# Patient Record
Sex: FEMALE | Employment: OTHER | URBAN - METROPOLITAN AREA
[De-identification: names, ages, dates, MRNs, and addresses within clinical notes are randomized per-mention and may not be internally consistent; named-entity substitution may affect disease eponyms.]

---

## 2017-04-12 ENCOUNTER — OFFICE VISIT (OUTPATIENT)
Dept: HEMATOLOGY | Age: 57
End: 2017-04-12

## 2017-04-12 ENCOUNTER — HOSPITAL ENCOUNTER (OUTPATIENT)
Dept: LAB | Age: 57
Discharge: HOME | End: 2017-04-12
Payer: COMMERCIAL

## 2017-04-12 VITALS
RESPIRATION RATE: 16 BRPM | BODY MASS INDEX: 32.62 KG/M2 | SYSTOLIC BLOOD PRESSURE: 151 MMHG | OXYGEN SATURATION: 100 % | DIASTOLIC BLOOD PRESSURE: 81 MMHG | TEMPERATURE: 98.2 F | HEART RATE: 75 BPM | WEIGHT: 203 LBS | HEIGHT: 66 IN

## 2017-04-12 DIAGNOSIS — B18.2 CHRONIC HEPATITIS C WITHOUT HEPATIC COMA (HCC): Primary | ICD-10-CM

## 2017-04-12 DIAGNOSIS — B18.2 CHRONIC HEPATITIS C WITHOUT HEPATIC COMA (HCC): ICD-10-CM

## 2017-04-12 PROBLEM — E78.00 HYPERCHOLESTEROLEMIA: Status: ACTIVE | Noted: 2017-04-12

## 2017-04-12 PROBLEM — J45.909 ASTHMA: Status: ACTIVE | Noted: 2017-04-12

## 2017-04-12 PROBLEM — E11.9 TYPE II DIABETES MELLITUS (HCC): Status: ACTIVE | Noted: 2017-04-12

## 2017-04-12 PROBLEM — I10 HYPERTENSION: Status: ACTIVE | Noted: 2017-04-12

## 2017-04-12 PROBLEM — Z94.7 HISTORY OF CORNEA TRANSPLANT: Status: ACTIVE | Noted: 2017-04-12

## 2017-04-12 PROBLEM — Z90.49 S/P CHOLECYSTECTOMY: Status: ACTIVE | Noted: 2017-04-12

## 2017-04-12 LAB
ALBUMIN SERPL BCP-MCNC: 3.2 G/DL (ref 3.4–5)
ALBUMIN/GLOB SERPL: 0.7 {RATIO} (ref 0.8–1.7)
ALP SERPL-CCNC: 114 U/L (ref 45–117)
ALT SERPL-CCNC: 38 U/L (ref 13–56)
ANION GAP BLD CALC-SCNC: 10 MMOL/L (ref 3–18)
AST SERPL W P-5'-P-CCNC: 42 U/L (ref 15–37)
BASOPHILS # BLD AUTO: 0 K/UL (ref 0–0.06)
BASOPHILS # BLD: 0 % (ref 0–2)
BILIRUB DIRECT SERPL-MCNC: 0.1 MG/DL (ref 0–0.2)
BILIRUB SERPL-MCNC: 0.3 MG/DL (ref 0.2–1)
BUN SERPL-MCNC: 29 MG/DL (ref 7–18)
BUN/CREAT SERPL: 20 (ref 12–20)
CALCIUM SERPL-MCNC: 8.8 MG/DL (ref 8.5–10.1)
CHLORIDE SERPL-SCNC: 106 MMOL/L (ref 100–108)
CO2 SERPL-SCNC: 26 MMOL/L (ref 21–32)
CREAT SERPL-MCNC: 1.44 MG/DL (ref 0.6–1.3)
DIFFERENTIAL METHOD BLD: ABNORMAL
EOSINOPHIL # BLD: 0.1 K/UL (ref 0–0.4)
EOSINOPHIL NFR BLD: 1 % (ref 0–5)
ERYTHROCYTE [DISTWIDTH] IN BLOOD BY AUTOMATED COUNT: 13 % (ref 11.6–14.5)
GLOBULIN SER CALC-MCNC: 4.4 G/DL (ref 2–4)
GLUCOSE SERPL-MCNC: 82 MG/DL (ref 74–99)
HCT VFR BLD AUTO: 36.8 % (ref 35–45)
HGB BLD-MCNC: 11.4 G/DL (ref 12–16)
LYMPHOCYTES # BLD AUTO: 41 % (ref 21–52)
LYMPHOCYTES # BLD: 1.8 K/UL (ref 0.9–3.6)
MCH RBC QN AUTO: 27.2 PG (ref 24–34)
MCHC RBC AUTO-ENTMCNC: 31 G/DL (ref 31–37)
MCV RBC AUTO: 87.8 FL (ref 74–97)
MONOCYTES # BLD: 0.3 K/UL (ref 0.05–1.2)
MONOCYTES NFR BLD AUTO: 7 % (ref 3–10)
NEUTS SEG # BLD: 2.3 K/UL (ref 1.8–8)
NEUTS SEG NFR BLD AUTO: 51 % (ref 40–73)
PLATELET # BLD AUTO: 334 K/UL (ref 135–420)
PMV BLD AUTO: 13 FL (ref 9.2–11.8)
POTASSIUM SERPL-SCNC: 5.6 MMOL/L (ref 3.5–5.5)
PROT SERPL-MCNC: 7.6 G/DL (ref 6.4–8.2)
RBC # BLD AUTO: 4.19 M/UL (ref 4.2–5.3)
SODIUM SERPL-SCNC: 142 MMOL/L (ref 136–145)
WBC # BLD AUTO: 4.5 K/UL (ref 4.6–13.2)

## 2017-04-12 PROCEDURE — 87902 NFCT AGT GNTYP ALYS HEP C: CPT | Performed by: INTERNAL MEDICINE

## 2017-04-12 PROCEDURE — 87521 HEPATITIS C PROBE&RVRS TRNSC: CPT | Performed by: INTERNAL MEDICINE

## 2017-04-12 PROCEDURE — 86706 HEP B SURFACE ANTIBODY: CPT | Performed by: INTERNAL MEDICINE

## 2017-04-12 PROCEDURE — 80076 HEPATIC FUNCTION PANEL: CPT | Performed by: INTERNAL MEDICINE

## 2017-04-12 PROCEDURE — 86708 HEPATITIS A ANTIBODY: CPT | Performed by: INTERNAL MEDICINE

## 2017-04-12 PROCEDURE — 87900 PHENOTYPE INFECT AGENT DRUG: CPT | Performed by: INTERNAL MEDICINE

## 2017-04-12 PROCEDURE — 86704 HEP B CORE ANTIBODY TOTAL: CPT | Performed by: INTERNAL MEDICINE

## 2017-04-12 PROCEDURE — 36415 COLL VENOUS BLD VENIPUNCTURE: CPT | Performed by: INTERNAL MEDICINE

## 2017-04-12 PROCEDURE — 80048 BASIC METABOLIC PNL TOTAL CA: CPT | Performed by: INTERNAL MEDICINE

## 2017-04-12 PROCEDURE — 87522 HEPATITIS C REVRS TRNSCRPJ: CPT | Performed by: INTERNAL MEDICINE

## 2017-04-12 PROCEDURE — 85025 COMPLETE CBC W/AUTO DIFF WBC: CPT | Performed by: INTERNAL MEDICINE

## 2017-04-12 RX ORDER — ASPIRIN 81 MG/1
TABLET ORAL DAILY
COMMUNITY

## 2017-04-12 NOTE — MR AVS SNAPSHOT
Visit Information Date & Time Provider Department Dept. Phone Encounter #  
 4/12/2017 11:30 AM Moshe Waite MD [address] [id number] Follow-up Instructions Return in about 4 weeks (around 5/10/2017) for Carlitos Vega. Follow-up and Disposition History Your Appointments 6/20/2017  2:30 PM  
Follow Up with Faustino Sargent NP [facility] [place] [facility] Appt Note: HCV  
 [id number] [address] [place] [address] [place] [address] [place] [address] Upcoming Health Maintenance Date Due Hepatitis C Screening 1960 DTaP/Tdap/Td series (1 - Tdap) 1/29/1980 PAP AKA CERVICAL CYTOLOGY 1/29/1980 BREAST CANCER SCRN MAMMOGRAM 1/29/2009 FOBT Q 1 YEAR AGE 50-75 1/29/2009 INFLUENZA AGE 9 TO ADULT 8/1/2016 Allergies as of 4/12/2017  Review Complete On: 4/12/2017 By: Moshe Waite MD  
 No Known Allergies Current Immunizations  Never Reviewed No immunizations on file. Not reviewed this visit You Were Diagnosed With   
  
 Codes Comments Chronic hepatitis C without hepatic coma (HCC)    -  Primary ICD-10-CM: B18.2 ICD-9-CM: 070.54 Vitals BP Pulse Temp Resp Height(growth percentile) Weight(growth percentile) 151/81 (BP 1 Location: Left arm, BP Patient Position: Sitting) 75 98.2 °F (36.8 °C) (Tympanic) 16 5' 6\" (1.676 m) 203 lb (92.1 kg) LMP SpO2 BMI OB Status Smoking Status (Exact Date) 100% 32.77 kg/m2 Menopause Never Smoker Vitals History BMI and BSA Data Body Mass Index Body Surface Area 32.77 kg/m 2 2.07 m 2 Your Updated Medication List  
  
   
This list is accurate as of: 4/12/17 12:19 PM.  Always use your most recent med list.  
  
  
  
  
 Lawrance Kussmaul Take  by inhalation. ALBUTEROL IN Take  by inhalation. aspirin delayed-release 81 mg tablet Take  by mouth daily. CLARITIN PO Take  by mouth. GLIPIZIDE PO Take  by mouth. JANUMET 50-1,000 mg per tablet Generic drug:  SITagliptin-metFORMIN Take 1 Tab by mouth two (2) times daily (with meals). LOSARTAN PO Take  by mouth. PRAVASTATIN PO Take  by mouth. Follow-up Instructions Return in about 4 weeks (around 5/10/2017) for [address] To-Do List   
 04/12/2017 Lab:  CBC WITH AUTOMATED DIFF   
  
 04/12/2017 Lab:  HCV NS5A DRUG RESISTANCE ASSAY   
  
 04/12/2017 Lab:  HCV RNA BY MANISH QL,RFLX TO QT   
  
 04/12/2017 Lab:  HEP A AB, TOTAL   
  
 04/12/2017 Lab:  HEP B SURFACE AB   
  
 04/12/2017 Lab:  HEP C GENOTYPE   
  
 04/12/2017 Lab:  HEPATIC FUNCTION PANEL   
  
 04/12/2017 Lab:  HEPATITIS B CORE AB, TOTAL   
  
 04/12/2017 Lab:  METABOLIC PANEL, BASIC   
  
 04/12/2017 Imaging:  US ABD LTD W ELASTOGRAPHY Introducing [facility]! King Huy introduces [employer] patient portal. Now you can access parts of your medical record, email your doctor's office, and request medication refills online. 1. In your internet browser, go to [email or web address] 2. Click on the First Time User? Click Here link in the Sign In box. You will see the New Member Sign Up page. 3. Enter your [employer] Access Code exactly as it appears below. You will not need to use this code after youve completed the sign-up process. If you do not sign up before the expiration date, you must request a new code. · [employer] Access Code: [email or web address] Expires: 7/11/2017 12:19 PM 
 
4. Enter the last four digits of your Social Security Number (xxxx) and Date of Birth (mm/dd/yyyy) as indicated and click Submit. You will be taken to the next sign-up page. 5. Create a [employer] ID.  This will be your [employer] login ID and cannot be changed, so think of one that is secure and easy to remember. 6. Create a [employer] password. You can change your password at any time. 7. Enter your Password Reset Question and Answer. This can be used at a later time if you forget your password. 8. Enter your e-mail address. You will receive e-mail notification when new information is available in [address] 9. Click Sign Up. You can now view and download portions of your medical record. 10. Click the Download Summary menu link to download a portable copy of your medical information. If you have questions, please visit the Frequently Asked Questions section of the [employer] website. Remember, [employer] is NOT to be used for urgent needs. For medical emergencies, dial 911. Now available from your iPhone and Android! Please provide this summary of care documentation to your next provider. Your primary care clinician is listed as Jayleen Mccurdy. If you have any questions after today's visit, please call [phone].

## 2017-04-12 NOTE — PROGRESS NOTES
93 Rizwan Boykin MD, CAROLE Roberts PA-C Earlyne Lease, MD, FACP, MD Nancy Durham, CAROLE Schmitz NP        [address] [facility], [address] [address]  22.     [phone]     FAX: [address], [address]     [place], [address]     [phone]     FAX: [phone]         Patient Care Team:  Eran Hernandez, [address] as PCP - General (Physician Assistant)      Problem List  Date Reviewed: 4/12/2017          Codes Class Noted    Chronic hepatitis C ([facility] ICD-10-CM: B18.2  ICD-9-CM: 070.54  4/12/2017        Type II diabetes mellitus ([facility] ICD-10-CM: E11.9  ICD-9-CM: 250.00  4/12/2017        Hypertension ICD-10-CM: I10  ICD-9-CM: 401.9  4/12/2017        Hypercholesterolemia ICD-10-CM: E78.00  ICD-9-CM: 272.0  4/12/2017        Asthma ICD-10-CM: J45.909  ICD-9-CM: 493.90  4/12/2017        History of cornea transplant ICD-10-CM: Z94.7  ICD-9-CM: V42.5  4/12/2017        S/P cholecystectomy ICD-10-CM: Z90.49  ICD-9-CM: V45.79  4/12/2017                The physicians listed above have asked me to see Pamela Hernandez in consultation regarding chronic HCV and its management. All medical records sent by the referring physicians were reviewed     The patient is a 61 y.o. Black female who was noted to have abnormalities in liver chemistries and subsequently tested positive for chronic HCV in 8/2016. Risk factors for acquiring HCV are not apparent. There was no history of acute incteric hepatitis. Imaging of the liver was not performed. An assessment of liver fibrosis with biopsy or elastography has not been performed. The patient has never received treatment for chronic HCV.       The most recent laboratory studies indicate that the liver transaminases are elevated, alkaline phosphatase is normal, tests of hepatic synthetic and metabolic function are normal, and the platelet count is normal.      The patient has no symptoms which can be attributed to the liver disorder. The patient has not experienced fatigue, pain in the right side over the liver,     The patient completes all daily activities without any functional limitations. ALLERGIES  No Known Allergies    MEDICATIONS  Current Outpatient Prescriptions   Medication Sig    SITagliptin-metFORMIN (JANUMET) 50-1,000 mg per tablet Take 1 Tab by mouth two (2) times daily (with meals).  GLIPIZIDE PO Take  by mouth.  LOSARTAN PO Take  by mouth.  PRAVASTATIN SODIUM (PRAVASTATIN PO) Take  by mouth.  LORATADINE (CLARITIN PO) Take  by mouth.  aspirin delayed-release 81 mg tablet Take  by mouth daily.  ALBUTEROL IN Take  by inhalation.  FLUTICASONE/SALMETEROL (ADVAIR DISKUS IN) Take  by inhalation. No current facility-administered medications for this visit. SYSTEM REVIEW NOT RELATED TO LIVER DISEASE OR REVIEWED ABOVE:  Constitution systems: Negative for fever, chills, weight gain, weight loss. Eyes: Negative for visual changes. ENT: Negative for sore throat, painful swallowing. Respiratory: Negative for cough, hemoptysis, SOB. Cardiology: Negative for chest pain, palpitations. GI:  Negative for constipation or diarrhea. : Negative for urinary frequency, dysuria, hematuria, nocturia. Skin: Negative for rash. Hematology: Negative for easy bruising, blood clots. Musculo-skelatal: Negative for back pain, muscle pain, weakness. Neurologic: Negative for headaches, dizziness, vertigo, memory problems not related to HE. Psychology: Negative for anxiety, depression. FAMILY HISTORY:  The patient has no knowledge of the father's medical condition. The mother has the following chronic diseases: breast cancer. There is no family history of liver disease.       SOCIAL HISTORY:  The patient is . The patient has no children. The patient has never used tobacco products. The patient has never consumed significant amounts of alcohol. The patient used to work for [employer]. The patient retired in 2015. PHYSICAL EXAMINATION:  /81 (BP 1 Location: Left arm, BP Patient Position: Sitting)  Pulse 75  Temp 98.2 °F (36.8 °C) (Tympanic)   Resp 16  Ht 5' 6\" (1.676 m)  Wt 203 lb (92.1 kg)  LMP  (Exact Date)  SpO2 100%  BMI 32.77 kg/m2  General: No acute distress. Eyes: Sclera anicteric. ENT: No oral lesions. Thyroid normal.  Nodes: No adenopathy. Skin: No spider angiomata. No jaundice. No palmar erythema. Respiratory: Lungs clear to auscultation. Cardiovascular: Regular heart rate. No murmurs. No JVD. Abdomen: Soft non-tender. Liver size normal to percussion/palpation. Spleen not palpable. No obvious ascites. Extremities: No edema. No muscle wasting. No gross arthritic changes. Neurologic: Alert and oriented. Cranial nerves grossly intact. No asterixis. LABORATORY STUDIES:  From 8/2016  AST/ALT/ALP/T Bili/ALB:  35/28/166/0.6/3.4  WBC/HB/PLT/INR:  4.9/11.7/286  BUN/CREAT:  18/1.0    SEROLOGIES:  8/2016. HBsurface antigen negative, anti-HCV positive. LIVER HISTOLOGY:  Not available or performed    ENDOSCOPIC PROCEDURES:  Not available or performed    RADIOLOGY:  Not available or performed    OTHER TESTING:  Not available or performed    ASSESSMENT AND PLAN:  Chronic HCV of unclear severity. Liver function is normal.  The platelet count is normal.      Will perform laboratory testing to monitor liver function and degree of liver injury. This included BMP, hepatic panel, CBC with platelet count,     Will perform and/or review results of HCV viral load and HCV genotype to define the specific treatment and duration of treatment that will be required.       Will perform serologic and virologic studies to assess for other causes of chronic liver disease. Will perform imaging of the liver with ultrasound. The need to perform an assessment of liver fibrosis was discussed with the patient. Liver biopsy has been used to assess the degree of fibrosis and the need for HCV treatment. The risks of performing the liver biopsy including pain, puncture of the lung, gallbladder, intestine or kidney and bleeding were discussed. The need to assess liver fibrosis was discussed. Sheer wave elastography can assess liver fibrosis and determine if a patient has advanced fibrosis or cirrhosis without the need for liver biopsy. Sheer wave elastography is now available at [facility]. This will be scheduled. The patient has not previously been treated for HCV. Discussed the treatment alternatives. The SVR/cure rate for HCV now exceeds 90% with just oral anti-viral therapy and no interferon injections or significant side effects for most patients with HCV. The specific treatment is dependent upon genotype, viral load and histology. The patient was directed to continue all current medications at the current dosages. There are no contraindications for the patient to take any medications that are necessary for treatment of other medical issues. The patient was counseled regarding alcohol consumption. The need for vaccination against viral hepatitis A and B will be assessed with serologic and instituted as appropriate. All of the above issues were discussed with the patient. All questions were answered. The patient expressed a clear understanding of the above. [address] [address] in 4 weeks to initiate HCV treatment.     Kaden Lopez MD  [facility] [place] of [address] [place] [address], [address] [place] [address] Street - [address]  [phone]

## 2017-04-13 LAB
HAV AB SER QL IA: NEGATIVE
HBV & HDV AB SER-IMP: NEGATIVE
HBV CORE AB SERPL QL IA: NEGATIVE
HBV SURFACE AB SERPL IA-ACNC: <3.1 MIU/ML
HEP BS AB COMMENT,HBSAC: ABNORMAL

## 2017-04-16 LAB
HCV RNA SERPL NAA+PROBE-ACNC: NORMAL IU/ML
HCV RNA SERPL NAA+PROBE-LOG IU: 5.96 LOG10 IU/ML
HCV RNA SERPL QL NAA+PROBE: POSITIVE
TEST INFORMATION:, 550031: NORMAL

## 2017-04-17 LAB
HCV GENTYP SERPL NAA+PROBE: NORMAL
PLEASE NOTE, 550474: NORMAL

## 2017-04-25 ENCOUNTER — HOSPITAL ENCOUNTER (OUTPATIENT)
Dept: ULTRASOUND IMAGING | Age: 57
Discharge: HOME | End: 2017-04-25
Attending: INTERNAL MEDICINE
Payer: COMMERCIAL

## 2017-04-25 DIAGNOSIS — B18.2 CHRONIC HEPATITIS C WITHOUT HEPATIC COMA (HCC): ICD-10-CM

## 2017-04-25 PROCEDURE — 0346T US ABD LTD W ELASTOGRAPHY: CPT

## 2017-04-26 LAB
HCV NS5 MUT DET ISLT GENOTYP: NORMAL
HCV RESIS PANELL ISLT GENOTYP: NORMAL
REF LAB TEST METHOD: NORMAL

## 2017-05-02 ENCOUNTER — TELEPHONE (OUTPATIENT)
Dept: HEMATOLOGY | Age: 57
End: 2017-05-02

## 2017-05-02 NOTE — TELEPHONE ENCOUNTER
Received call from patient. Verified name and date of birth. Patient informed me that she had Elastography imaging and labs drawn in April. Patient next appointment with Eduin Arnett June 2017, however would like results prior to that appointment.

## 2017-05-04 NOTE — TELEPHONE ENCOUNTER
Pt called to go over lab results and U/S results. Verified upcoming appt. Pt. Verbalized understanding.

## 2017-08-16 ENCOUNTER — HOSPITAL ENCOUNTER (OUTPATIENT)
Dept: LAB | Age: 57
Discharge: HOME | End: 2017-08-16
Payer: COMMERCIAL

## 2017-08-16 ENCOUNTER — OFFICE VISIT (OUTPATIENT)
Dept: HEMATOLOGY | Age: 57
End: 2017-08-16

## 2017-08-16 VITALS
DIASTOLIC BLOOD PRESSURE: 85 MMHG | WEIGHT: 202.2 LBS | SYSTOLIC BLOOD PRESSURE: 177 MMHG | HEIGHT: 66 IN | TEMPERATURE: 97.6 F | RESPIRATION RATE: 16 BRPM | OXYGEN SATURATION: 100 % | BODY MASS INDEX: 32.5 KG/M2 | HEART RATE: 78 BPM

## 2017-08-16 DIAGNOSIS — B18.2 CHRONIC HEPATITIS C WITHOUT HEPATIC COMA (HCC): Primary | ICD-10-CM

## 2017-08-16 DIAGNOSIS — B18.2 CHRONIC HEPATITIS C WITHOUT HEPATIC COMA (HCC): ICD-10-CM

## 2017-08-16 LAB
ALBUMIN SERPL-MCNC: 3.3 G/DL (ref 3.4–5)
ALBUMIN/GLOB SERPL: 0.7 {RATIO} (ref 0.8–1.7)
ALP SERPL-CCNC: 178 U/L (ref 45–117)
ALT SERPL-CCNC: 36 U/L (ref 13–56)
ANION GAP SERPL CALC-SCNC: 10 MMOL/L (ref 3–18)
AST SERPL-CCNC: 41 U/L (ref 15–37)
BASOPHILS # BLD: 0 K/UL (ref 0–0.06)
BASOPHILS NFR BLD: 0 % (ref 0–2)
BILIRUB DIRECT SERPL-MCNC: 0.1 MG/DL (ref 0–0.2)
BILIRUB SERPL-MCNC: 0.4 MG/DL (ref 0.2–1)
BUN SERPL-MCNC: 33 MG/DL (ref 7–18)
BUN/CREAT SERPL: 22 (ref 12–20)
CALCIUM SERPL-MCNC: 8.9 MG/DL (ref 8.5–10.1)
CHLORIDE SERPL-SCNC: 103 MMOL/L (ref 100–108)
CO2 SERPL-SCNC: 26 MMOL/L (ref 21–32)
CREAT SERPL-MCNC: 1.52 MG/DL (ref 0.6–1.3)
DIFFERENTIAL METHOD BLD: ABNORMAL
EOSINOPHIL # BLD: 0.1 K/UL (ref 0–0.4)
EOSINOPHIL NFR BLD: 1 % (ref 0–5)
ERYTHROCYTE [DISTWIDTH] IN BLOOD BY AUTOMATED COUNT: 12.6 % (ref 11.6–14.5)
ETHANOL SERPL-MCNC: <3 MG/DL (ref 0–3)
GLOBULIN SER CALC-MCNC: 4.9 G/DL (ref 2–4)
GLUCOSE SERPL-MCNC: 183 MG/DL (ref 74–99)
HCT VFR BLD AUTO: 38.4 % (ref 35–45)
HGB BLD-MCNC: 12.4 G/DL (ref 12–16)
LYMPHOCYTES # BLD: 1.6 K/UL (ref 0.9–3.6)
LYMPHOCYTES NFR BLD: 36 % (ref 21–52)
MCH RBC QN AUTO: 27.8 PG (ref 24–34)
MCHC RBC AUTO-ENTMCNC: 32.3 G/DL (ref 31–37)
MCV RBC AUTO: 86.1 FL (ref 74–97)
MONOCYTES # BLD: 0.4 K/UL (ref 0.05–1.2)
MONOCYTES NFR BLD: 9 % (ref 3–10)
NEUTS SEG # BLD: 2.5 K/UL (ref 1.8–8)
NEUTS SEG NFR BLD: 54 % (ref 40–73)
PLATELET # BLD AUTO: 362 K/UL (ref 135–420)
PMV BLD AUTO: 13.3 FL (ref 9.2–11.8)
POTASSIUM SERPL-SCNC: 5.8 MMOL/L (ref 3.5–5.5)
PROT SERPL-MCNC: 8.2 G/DL (ref 6.4–8.2)
RBC # BLD AUTO: 4.46 M/UL (ref 4.2–5.3)
SODIUM SERPL-SCNC: 139 MMOL/L (ref 136–145)
WBC # BLD AUTO: 4.6 K/UL (ref 4.6–13.2)

## 2017-08-16 PROCEDURE — 80076 HEPATIC FUNCTION PANEL: CPT | Performed by: NURSE PRACTITIONER

## 2017-08-16 PROCEDURE — 36415 COLL VENOUS BLD VENIPUNCTURE: CPT | Performed by: NURSE PRACTITIONER

## 2017-08-16 PROCEDURE — 87522 HEPATITIS C REVRS TRNSCRPJ: CPT | Performed by: NURSE PRACTITIONER

## 2017-08-16 PROCEDURE — 85025 COMPLETE CBC W/AUTO DIFF WBC: CPT | Performed by: NURSE PRACTITIONER

## 2017-08-16 PROCEDURE — 80307 DRUG TEST PRSMV CHEM ANLYZR: CPT | Performed by: NURSE PRACTITIONER

## 2017-08-16 PROCEDURE — 80048 BASIC METABOLIC PNL TOTAL CA: CPT | Performed by: NURSE PRACTITIONER

## 2017-08-16 RX ORDER — LEDIPASVIR AND SOFOSBUVIR 90; 400 MG/1; MG/1
1 TABLET, FILM COATED ORAL DAILY
Qty: 28 TAB | Refills: 1 | Status: SHIPPED | OUTPATIENT
Start: 2017-08-16 | End: 2017-10-11

## 2017-08-16 NOTE — MR AVS SNAPSHOT
Visit Information Date & Time Provider Department Dept. Phone Encounter #  
 8/16/2017  9:00 AM CAROLE [address] [id number] Follow-up Instructions Return in about 24 weeks (around 1/31/2018). Upcoming Health Maintenance Date Due HEMOGLOBIN A1C Q6M 1960 LIPID PANEL Q1 1960 FOOT EXAM Q1 1/29/1969 MICROALBUMIN Q1 1/29/1969 EYE EXAM RETINAL OR DILATED Q1 1/29/1969 Pneumococcal 19-64 Medium Risk (1 of 1 - PPSV23) 1/29/1978 DTaP/Tdap/Td series (1 - Tdap) 1/29/1980 PAP AKA CERVICAL CYTOLOGY 1/29/1980 BREAST CANCER SCRN MAMMOGRAM 1/29/2009 FOBT Q 1 YEAR AGE 50-75 1/29/2009 INFLUENZA AGE 9 TO ADULT 8/1/2017 Allergies as of 8/16/2017  Review Complete On: 8/16/2017 By: Mónica Sosa No Known Allergies Current Immunizations  Never Reviewed No immunizations on file. Not reviewed this visit You Were Diagnosed With   
  
 Codes Comments Chronic hepatitis C without hepatic coma (HCC)    -  Primary ICD-10-CM: B18.2 ICD-9-CM: 070.54 Vitals BP Pulse Temp Resp Height(growth percentile) 177/85 (BP 1 Location: Left arm, BP Patient Position: Sitting) 78 97.6 °F (36.4 °C) (Tympanic) 16 5' 6\" (1.676 m) Weight(growth percentile) SpO2 BMI OB Status Smoking Status 202 lb 3.2 oz (91.7 kg) 100% 32.64 kg/m2 Menopause Never Smoker Vitals History BMI and BSA Data Body Mass Index Body Surface Area  
 32.64 kg/m 2 2.07 m 2 Preferred Pharmacy Pharmacy Name Phone [facility] @ [address] [phone] Your Updated Medication List  
  
   
This list is accurate as of: 8/16/17  9:37 AM.  Always use your most recent med list.  
  
  
  
  
 Louvella Hoot Take  by inhalation. ALBUTEROL IN Take  by inhalation. aspirin delayed-release 81 mg tablet Take  by mouth daily. CLARITIN PO Take  by mouth. GLIPIZIDE PO Take  by mouth. JANUMET 50-1,000 mg per tablet Generic drug:  SITagliptin-metFORMIN Take 1 Tab by mouth two (2) times daily (with meals). ledipasvir-sofosbuvir  mg Tab Commonly known as:  Kamala Alstrom Take 1 Tab by mouth daily for 56 days. Indications: CHRONIC HEPATITIS C - GENOTYPE 1  
  
 LOSARTAN PO Take  by mouth. PRAVASTATIN PO Take  by mouth. Prescriptions Sent to Pharmacy Refills  
 ledipasvir-sofosbuvir (HARVONI)  mg tab 1 Sig: Take 1 Tab by mouth daily for 56 days. Indications: CHRONIC HEPATITIS C - GENOTYPE 1 Class: Normal  
 Pharmacy: D[facility]idalgo3 @ [address] [facility] #: [phone] Route: Oral  
  
Follow-up Instructions Return in about 24 weeks (around 1/31/2018). Introducing [facility]! Doug Carrasquillo introduces [employer] patient portal. Now you can access parts of your medical record, email your doctor's office, and request medication refills online. 1. In your internet browser, go to [email or web address]://D and K interprises. EBIQUOUS/[employer] and [employer] interprises 2. Click on the First Time User? Click Here link in the Sign In box. You will see the New Member Sign Up page. 3. Enter your [employer] Access Code exactly as it appears below. You will not need to use this code after youve completed the sign-up process. If you do not sign up before the expiration date, you must request a new code. · [employer] Access Code: [email or web address] Expires: 11/14/2017  9:37 AM 
 
4. Enter the last four digits of your Social Security Number (xxxx) and Date of Birth (mm/dd/yyyy) as indicated and click Submit. You will be taken to the next sign-up page. 5. Create a [employer] ID. This will be your [employer] login ID and cannot be changed, so think of one that is secure and easy to remember. 6. Create a [employer] password. You can change your password at any time. 7. Enter your Password Reset Question and Answer. This can be used at a later time if you forget your password. 8. Enter your e-mail address. You will receive e-mail notification when new information is available in [address] 9. Click Sign Up. You can now view and download portions of your medical record. 10. Click the Download Summary menu link to download a portable copy of your medical information. If you have questions, please visit the Frequently Asked Questions section of the [employer] website. Remember, [employer] is NOT to be used for urgent needs. For medical emergencies, dial 911. Now available from your iPhone and Android! Please provide this summary of care documentation to your next provider. Your primary care clinician is listed as Efren Miguel. If you have any questions after today's visit, please call [phone].

## 2017-08-16 NOTE — PROGRESS NOTES
[address] Sandeep, [address], [place], [place]       CAROLE Villafuerte PA-C Charyl Chock, NP Michele Lab, NP        at [address]     [address] [place] [address], [address] Gissell Hopkins  22.     [phone]     FAX: [phone]    at [facility], [address] [place] [address], [address] [phone]     FAX: [phone]         Patient Care Team:  Brooks Barragan as PCP - General (Physician Assistant)      Problem List  Date Reviewed: 8/16/2017          Codes Class Noted    Chronic hepatitis C ([facility] 75.) ICD-10-CM: B18.2  ICD-9-CM: 070.54  4/12/2017        Type II diabetes mellitus ([facility] 75.) ICD-10-CM: E11.9  ICD-9-CM: 250.00  4/12/2017        Hypertension ICD-10-CM: I10  ICD-9-CM: 401.9  4/12/2017        Hypercholesterolemia ICD-10-CM: E78.00  ICD-9-CM: 272.0  4/12/2017        Asthma ICD-10-CM: J45.909  ICD-9-CM: 493.90  4/12/2017        History of cornea transplant ICD-10-CM: Z94.7  ICD-9-CM: V42.5  4/12/2017        S/P cholecystectomy ICD-10-CM: Z90.49  ICD-9-CM: V45.79  4/12/2017              Carrol Stokes returns to the [facility] today for education and management of chronic hepatitis C. The active problem list, all pertinent past medical history, medications, liver histology, endoscopic studies, radiologic findings and laboratory findings related to the liver disorder were reviewed with the patient. The patient is a 61 y.o. Black female who was noted to have abnormalities in liver chemistries and subsequently tested positive for chronic HCV in 8/2016. Risk factors for acquiring HCV are not apparent. There was no history of acute incteric hepatitis. Imaging of the liver was recently performed. The liver is normal  in size. Normal hepatic echotexture. No focal mass. Shear wave elastography was performed with the ultrasound.   This suggests portal fibrosis, Metavir score of F2. Wide E Std of 1.51 suggests fatty liver. A liver biopsy has not been performed. The patient has never received treatment for chronic HCV. The most recent laboratory studies indicate that the liver transaminases are elevated, alkaline phosphatase is elevated, tests of hepatic synthetic and metabolic function are normal, and the platelet count is normal.      The patient has no symptoms which can be attributed to the liver disorder. The patient completes all daily activities without any functional limitations. The patient has not experienced fatigue, fevers, chills, shortness of breath, chest pain, pain in the right side over the liver, diffuse abdominal pain, nausea, vomiting, constipation, diarrhrea, dry eyes, dry mouth, arthralgias, myalgias, yellowing of the eyes or skin, itching, dark urine, problems concentrating, swelling of the abdomen, swelling of the lower extremities, hematemesis, or hematochezia. ALLERGIES  No Known Allergies    MEDICATIONS  Current Outpatient Prescriptions   Medication Sig    SITagliptin-metFORMIN (JANUMET) 50-1,000 mg per tablet Take 1 Tab by mouth two (2) times daily (with meals).  GLIPIZIDE PO Take  by mouth.  LOSARTAN PO Take  by mouth.  PRAVASTATIN SODIUM (PRAVASTATIN PO) Take  by mouth.  LORATADINE (CLARITIN PO) Take  by mouth.  aspirin delayed-release 81 mg tablet Take  by mouth daily.  ALBUTEROL IN Take  by inhalation.  FLUTICASONE/SALMETEROL (ADVAIR DISKUS IN) Take  by inhalation. No current facility-administered medications for this visit. SYSTEM REVIEW NOT RELATED TO LIVER DISEASE OR REVIEWED ABOVE:  Constitution systems: Negative for fever, chills, weight gain, weight loss. Eyes: Negative for visual changes. ENT: Negative for sore throat, painful swallowing. Respiratory: Negative for cough, hemoptysis, SOB. Cardiology: Negative for chest pain, palpitations.   GI:  Negative for constipation or diarrhea. : Negative for urinary frequency, dysuria, hematuria, nocturia. Skin: Negative for rash. Hematology: Negative for easy bruising, blood clots. Musculo-skelatal: Negative for back pain, muscle pain, weakness. Neurologic: Negative for headaches, dizziness, vertigo, memory problems not related to HE. Psychology: Negative for anxiety, depression. FAMILY HISTORY:  The patient has no knowledge of the father's medical condition. The mother has the following chronic diseases: breast cancer. There is no family history of liver disease. SOCIAL HISTORY:  The patient is . The patient has no children. The patient has never used tobacco products. The patient has never consumed significant amounts of alcohol. The patient used to work for [employer]. The patient retired in 2015. PHYSICAL EXAMINATION:  /85 (BP 1 Location: Left arm, BP Patient Position: Sitting)  Pulse 78  Temp 97.6 °F (36.4 °C) (Tympanic)   Resp 16  Ht 5' 6\" (1.676 m)  Wt 202 lb 3.2 oz (91.7 kg)  SpO2 100%  BMI 32.64 kg/m2  General: No acute distress. Eyes: Sclera anicteric. ENT: No oral lesions. Thyroid normal.  Nodes: No adenopathy. Skin: No spider angiomata. No jaundice. No palmar erythema. Respiratory: Lungs clear to auscultation. Cardiovascular: Regular heart rate. No murmurs. No JVD. Abdomen: Soft non-tender. Liver size normal to percussion/palpation. Spleen not palpable. No obvious ascites. Extremities: No edema. No muscle wasting. No gross arthritic changes. Neurologic: Alert and oriented. Cranial nerves grossly intact. No asterixis.     LABORATORY STUDIES:  Liver [place] [address] [place] [address] Units 8/16/2017 4/12/2017   WBC 4.6 - 13.2 K/uL 4.6 4.5 (L)   ANC 1.8 - 8.0 K/UL 2.5 2.3   HGB 12.0 - 16.0 g/dL 12.4 11.4 (L)    - 420 K/uL 362 334   AST 15 - 37 U/L 41 (H) 42 (H)   ALT 13 - 56 U/L 36 38   Alk Phos 45 - 117 U/L 178 (H) 114   Bili, Total 0.2 - 1.0 MG/DL 0.4 0.3   Bili, Direct 0.0 - 0.2 MG/DL 0.1 0.1   Albumin 3.4 - 5.0 g/dL 3.3 (L) 3.2 (L)   BUN 7.0 - 18 MG/DL 33 (H) 29 (H)   Creat 0.6 - 1.3 MG/DL 1.52 (H) 1.44 (H)   Na 136 - 145 mmol/L 139 142   K 3.5 - 5.5 mmol/L 5.8 (H) 5.6 (H)   Cl 100 - 108 mmol/L 103 106   CO2 21 - 32 mmol/L 26 26   Glucose 74 - 99 mg/dL 183 (H) 82     From 8/2016  AST/ALT/ALP/T Bili/ALB:  35/28/166/0.6/3.4  WBC/HB/PLT/INR:  4.9/11.7/286  BUN/CREAT:  18/1.0    SEROLOGIES:  8/2016. HBsurface antigen negative, anti-HCV positive. Serologies Latest Ref Rng & Units 4/12/2017   Hep A Ab, Total NEGATIVE   NEGATIVE   Hep B Core Ab, Total NEGATIVE   NEGATIVE   Hep B Surface Ab >10.0 mIU/mL <3.10 (L)   Hep B Surface Ab Interp POS   NEGATIVE (A)   Hep C Genotype  1b   HCV RT-PCR, Quant IU/mL [id number]       LIVER HISTOLOGY:  04/2017. Shear wave elastography. E Mean is 8.34 kPa, E Median is 8.46 kPa, E Std is 1.51 kPa. Portal fibrosis with fatty liver. ENDOSCOPIC PROCEDURES:  Not available or performed    RADIOLOGY:  Not available or performed    OTHER TESTING:  Not available or performed    ASSESSMENT AND PLAN:  Chronic HCV with portal fibrosis. The most recent laboratory studies indicate that the liver transaminases are normal, alkaline phosphatase is elevated, tests of hepatic synthetic and metabolic function are normal, and the platelet count is normal.  Will perform laboratory testing to monitor liver function and degree of liver injury. This will include hepatic panel, a CBC w/ diff, a BMP, drug and alcohol screening, and HCV RNA. Serologic evaluation was negative for all causes of chronic liver disease. Ultrasound was recently performed with shear wave elastography. Liver appears essentially normal without hepatic masses. Shehas portal fibrosis with a component of fatty liver. This will be repeated one year after eradication of HCv to document the regression of hepatic fibrosis. HCV.   The patient has genotype 1b and is treatment naive. We discussed treatment options. One treatment regime is Harvoni, a combination pill of 400 mg sofosbuvir and 90 mg ledipasvir. The treatment regime is one tablet daily for 8 weeks. She would like to be treated with this regime. Nan Kamara ordered today through [facility] in [place]. The treatment regime was discussed in detail. I stressed the importance of her having labs drawn at treatment week 4. An HCV practice agreement was signed. Education material was provided. I explained to the patient that I ordered the Nan Kamara through [facility] in [place]. If her insurance carrier approves her for the ordered regime, the Nan Kamara will be delivered to her home. She may begin taking the treatment medications as soon as they arrive. she is to call and make an appointment for treatment week #4 once she begins therapy. She voiced understanding of this plan. The patient was directed to continue all current medications at the current dosages. There are no contraindications for the patient to take any medications that are necessary for treatment of other medical issues. The patient was instructed not to start any PPI while on treatment. The patient was instructed not to take any antacids within 4 hours of taking the Harvoni. The patient verbalized understanding of these instructions. The patient was counseled regarding alcohol consumption. Vaccination for viral hepatitis A and B is recommended. The patient does not have serologic evidence of prior exposure or vaccination with immunity. All of the above issues were discussed with the patient. All questions were answered. The patient expressed a clear understanding of the above. [address] [address] in 24 weeks with labs to be drawn at treatment week 4.       Andrei Meyer NP   Liver [place] of [address] [place] [address], [address] [place] [address], [address] [facility]   [phone]

## 2017-08-18 LAB
AMPHETAMINES SERPL QL SCN: NEGATIVE NG/ML
BARBITURATES SERPL QL SCN: NEGATIVE UG/ML
CANNABINOIDS SERPL QL SCN: NEGATIVE NG/ML
COCAINE+BZE SERPL QL SCN: NEGATIVE NG/ML
OPIATES SERPL QL SCN: NEGATIVE NG/ML
OXYCODONE, 790407: NEGATIVE NG/ML
PCP SERPL QL SCN: NEGATIVE NG/ML

## 2017-09-20 LAB
HCV GRAPH, HCVGR: NORMAL
HCV RNA SERPL NAA+PROBE-ACNC: NORMAL IU/ML
HCV RNA SERPL NAA+PROBE-LOG IU: 6.4 LOG10 IU/ML
SERIAL MONITORING: NORMAL

## 2017-11-15 ENCOUNTER — TELEPHONE (OUTPATIENT)
Dept: HEMATOLOGY | Age: 57
End: 2017-11-15

## 2017-11-15 DIAGNOSIS — B18.2 CHRONIC HEPATITIS C WITHOUT HEPATIC COMA (HCC): Primary | ICD-10-CM

## 2017-11-28 ENCOUNTER — HOSPITAL ENCOUNTER (OUTPATIENT)
Dept: LAB | Age: 57
Discharge: HOME | End: 2017-11-28
Payer: COMMERCIAL

## 2017-11-28 ENCOUNTER — OFFICE VISIT (OUTPATIENT)
Dept: HEMATOLOGY | Age: 57
End: 2017-11-28

## 2017-11-28 VITALS
OXYGEN SATURATION: 100 % | HEIGHT: 66 IN | SYSTOLIC BLOOD PRESSURE: 185 MMHG | BODY MASS INDEX: 33.91 KG/M2 | WEIGHT: 211 LBS | DIASTOLIC BLOOD PRESSURE: 89 MMHG | RESPIRATION RATE: 16 BRPM | HEART RATE: 74 BPM | TEMPERATURE: 97.4 F

## 2017-11-28 DIAGNOSIS — B18.2 CHRONIC HEPATITIS C WITHOUT HEPATIC COMA (HCC): ICD-10-CM

## 2017-11-28 DIAGNOSIS — B18.2 CHRONIC HEPATITIS C WITHOUT HEPATIC COMA (HCC): Primary | ICD-10-CM

## 2017-11-28 LAB
ALBUMIN SERPL-MCNC: 3.3 G/DL (ref 3.4–5)
ALBUMIN/GLOB SERPL: 0.7 {RATIO} (ref 0.8–1.7)
ALP SERPL-CCNC: 136 U/L (ref 45–117)
ALT SERPL-CCNC: 20 U/L (ref 13–56)
ANION GAP SERPL CALC-SCNC: 9 MMOL/L (ref 3–18)
AST SERPL-CCNC: 22 U/L (ref 15–37)
BASOPHILS # BLD: 0 K/UL (ref 0–0.06)
BASOPHILS NFR BLD: 0 % (ref 0–2)
BILIRUB DIRECT SERPL-MCNC: 0.1 MG/DL (ref 0–0.2)
BILIRUB SERPL-MCNC: 0.3 MG/DL (ref 0.2–1)
BUN SERPL-MCNC: 27 MG/DL (ref 7–18)
BUN/CREAT SERPL: 14 (ref 12–20)
CALCIUM SERPL-MCNC: 9 MG/DL (ref 8.5–10.1)
CHLORIDE SERPL-SCNC: 108 MMOL/L (ref 100–108)
CO2 SERPL-SCNC: 25 MMOL/L (ref 21–32)
CREAT SERPL-MCNC: 1.93 MG/DL (ref 0.6–1.3)
DIFFERENTIAL METHOD BLD: ABNORMAL
EOSINOPHIL # BLD: 0.1 K/UL (ref 0–0.4)
EOSINOPHIL NFR BLD: 2 % (ref 0–5)
ERYTHROCYTE [DISTWIDTH] IN BLOOD BY AUTOMATED COUNT: 12.7 % (ref 11.6–14.5)
GLOBULIN SER CALC-MCNC: 4.8 G/DL (ref 2–4)
GLUCOSE SERPL-MCNC: 101 MG/DL (ref 74–99)
HCT VFR BLD AUTO: 36.4 % (ref 35–45)
HGB BLD-MCNC: 11.3 G/DL (ref 12–16)
LYMPHOCYTES # BLD: 2 K/UL (ref 0.9–3.6)
LYMPHOCYTES NFR BLD: 30 % (ref 21–52)
MCH RBC QN AUTO: 27.4 PG (ref 24–34)
MCHC RBC AUTO-ENTMCNC: 31 G/DL (ref 31–37)
MCV RBC AUTO: 88.3 FL (ref 74–97)
MONOCYTES # BLD: 0.6 K/UL (ref 0.05–1.2)
MONOCYTES NFR BLD: 9 % (ref 3–10)
NEUTS SEG # BLD: 4 K/UL (ref 1.8–8)
NEUTS SEG NFR BLD: 59 % (ref 40–73)
PLATELET # BLD AUTO: 357 K/UL (ref 135–420)
PMV BLD AUTO: 13.1 FL (ref 9.2–11.8)
POTASSIUM SERPL-SCNC: 6 MMOL/L (ref 3.5–5.5)
PROT SERPL-MCNC: 8.1 G/DL (ref 6.4–8.2)
RBC # BLD AUTO: 4.12 M/UL (ref 4.2–5.3)
SODIUM SERPL-SCNC: 142 MMOL/L (ref 136–145)
WBC # BLD AUTO: 6.7 K/UL (ref 4.6–13.2)

## 2017-11-28 PROCEDURE — 85025 COMPLETE CBC W/AUTO DIFF WBC: CPT | Performed by: NURSE PRACTITIONER

## 2017-11-28 PROCEDURE — 80048 BASIC METABOLIC PNL TOTAL CA: CPT | Performed by: NURSE PRACTITIONER

## 2017-11-28 PROCEDURE — 36415 COLL VENOUS BLD VENIPUNCTURE: CPT | Performed by: NURSE PRACTITIONER

## 2017-11-28 PROCEDURE — 87521 HEPATITIS C PROBE&RVRS TRNSC: CPT | Performed by: NURSE PRACTITIONER

## 2017-11-28 PROCEDURE — 80076 HEPATIC FUNCTION PANEL: CPT | Performed by: NURSE PRACTITIONER

## 2017-11-28 NOTE — PROGRESS NOTES
La Velazquez is a 61 y.o. female    No chief complaint on file. 1. Have you been to the ER, urgent care clinic or hospitalized since your last visit? NO.     2. Have you seen or consulted any other health care providers outside of the [address] [place] [address] since your last visit (Include any pap smears or colon screening)?  NO  Learning Assessment 4/12/2017   PRIMARY LEARNER Patient   PRIMARY LANGUAGE ENGLISH   LEARNER PREFERENCE PRIMARY DEMONSTRATION   ANSWERED BY self   RELATIONSHIP SELF

## 2017-11-28 NOTE — MR AVS SNAPSHOT
Visit Information Date & Time Provider Department Dept. Phone Encounter #  
 11/28/2017  8:30 AM CAROLE Grissom [address] of  [address] [id number] Follow-up Instructions Return in about 16 weeks (around 3/20/2018). Upcoming Health Maintenance Date Due HEMOGLOBIN A1C Q6M 1959 LIPID PANEL Q1 1959 FOOT EXAM Q1 1/29/1969 MICROALBUMIN Q1 1/29/1969 EYE EXAM RETINAL OR DILATED Q1 1/29/1969 Pneumococcal 19-64 Medium Risk (1 of 1 - PPSV23) 1/29/1978 DTaP/Tdap/Td series (1 - Tdap) 1/29/1980 PAP AKA CERVICAL CYTOLOGY 1/29/1980 BREAST CANCER SCRN MAMMOGRAM 1/29/2009 FOBT Q 1 YEAR AGE 50-75 1/29/2009 Influenza Age 5 to Adult 8/1/2017 Allergies as of 11/28/2017  Review Complete On: 11/28/2017 By: Alia Keller No Known Allergies Current Immunizations  Never Reviewed No immunizations on file. Not reviewed this visit Vitals BP Pulse Temp Resp Height(growth percentile) 185/89 (BP 1 Location: Right arm, BP Patient Position: Sitting) 74 97.4 °F (36.3 °C) (Tympanic) 16 5' 6\" (1.676 m) Weight(growth percentile) SpO2 BMI OB Status Smoking Status 211 lb (95.7 kg) 100% 34.06 kg/m2 Menopause Never Smoker Vitals History BMI and BSA Data Body Mass Index Body Surface Area 34.06 kg/m 2 2.11 m 2 Preferred Pharmacy Pharmacy Name Phone Dyan Cordova @ [address] [address] Brenda Coins [phone] Your Updated Medication List  
  
   
This list is accurate as of: 11/28/17  8:45 AM.  Always use your most recent med list.  
  
  
  
  
 Rama Muñoz Take  by inhalation. ALBUTEROL IN Take  by inhalation. aspirin delayed-release 81 mg tablet Take  by mouth daily. CLARITIN PO Take  by mouth. GLIPIZIDE PO Take  by mouth. JANUMET 50-1,000 mg per tablet Generic drug:  SITagliptin-metFORMIN  
 Take 1 Tab by mouth two (2) times daily (with meals). LOSARTAN PO Take  by mouth. PRAVASTATIN PO Take  by mouth. Follow-up Instructions Return in about 16 weeks (around 3/20/2018). Introducing [facility] SERVICES! Nola Herrera introduces [employer] patient portal. Now you can access parts of your medical record, email your doctor's office, and request medication refills online. 1. In your internet browser, go to [email or web address] 2. Click on the First Time User? Click Here link in the Sign In box. You will see the New Member Sign Up page. 3. Enter your [employer] Access Code exactly as it appears below. You will not need to use this code after youve completed the sign-up process. If you do not sign up before the expiration date, you must request a new code. · [employer] Access Code: [email or web address] Expires: 2/26/2018  8:45 AM 
 
4. Enter the last four digits of your Social Security Number (xxxx) and Date of Birth (mm/dd/yyyy) as indicated and click Submit. You will be taken to the next sign-up page. 5. Create a [employer] ID. This will be your [employer] login ID and cannot be changed, so think of one that is secure and easy to remember. 6. Create a [employer] password. You can change your password at any time. 7. Enter your Password Reset Question and Answer. This can be used at a later time if you forget your password. 8. Enter your e-mail address. You will receive e-mail notification when new information is available in [address] 9. Click Sign Up. You can now view and download portions of your medical record. 10. Click the Download Summary menu link to download a portable copy of your medical information. If you have questions, please visit the Frequently Asked Questions section of the [employer] website. Remember, [employer] is NOT to be used for urgent needs. For medical emergencies, dial 911. Now available from your iPhone and Android! Please provide this summary of care documentation to your next provider. Your primary care clinician is listed as Dottie Show. If you have any questions after today's visit, please call [phone].

## 2017-11-28 NOTE — ACP (ADVANCE CARE PLANNING)
Do you have an Advanced Directive? NO    Would you like information on Advanced Directives? NO      Was information provided?  NO

## 2017-11-28 NOTE — PROGRESS NOTES
[address] MD Sandeep, [address], [place], [place]       CAROLE Johns PA-C Constantino Andrea, NP Pamella Dotter, NP        at [address]     [address] [place] [address], [address] Gissell Hopkins  22.     [phone]     FAX: [phone]    at [address], [address] [place] [address], [address]     [phone]     FAX: [phone]         Patient Care Team:  Brooks Jamil as PCP - General (Physician Assistant)      Problem List  Date Reviewed: 11/28/2017          Codes Class Noted    Chronic hepatitis C ([facility] 75.) ICD-10-CM: B18.2  ICD-9-CM: 070.54  4/12/2017        Type II diabetes mellitus ([facility] ICD-10-CM: E11.9  ICD-9-CM: 250.00  4/12/2017        Hypertension ICD-10-CM: I10  ICD-9-CM: 401.9  4/12/2017        Hypercholesterolemia ICD-10-CM: E78.00  ICD-9-CM: 272.0  4/12/2017        Asthma ICD-10-CM: J45.909  ICD-9-CM: 493.90  4/12/2017        History of cornea transplant ICD-10-CM: Z94.7  ICD-9-CM: V42.5  4/12/2017        S/P cholecystectomy ICD-10-CM: Z90.49  ICD-9-CM: V45.79  4/12/2017              Anjum Mei returns to the [place] [address] today for education and management of chronic hepatitis C. The patient began HCV on 10/24/2017 with once daily Harvoni. She will be treated for 8 weeks total.  This is the only time the HCV has ever been treated. The active problem list, all pertinent past medical history, medications, liver histology, endoscopic studies, radiologic findings and laboratory findings related to the liver disorder were reviewed with the patient. The patient is a 61 y.o. Black female who was noted to have abnormalities in liver chemistries and subsequently tested positive for chronic HCV in 8/2016. Risk factors for acquiring HCV are not apparent. There was no history of acute incteric hepatitis. Imaging of the liver was recently performed.   The liver is normal  in size. Normal hepatic echotexture. No focal mass. Shear wave elastography was performed with the ultrasound. This suggests portal fibrosis, Metavir score of F2. Wide E Std of 1.51 suggests fatty liver. A liver biopsy has not been performed. The most recent laboratory studies indicate that the liver transaminases are elevated, alkaline phosphatase is elevated, tests of hepatic synthetic and metabolic function are normal, and the platelet count is normal.      The patient has no symptoms which can be attributed to the liver disorder. The patient completes all daily activities without any functional limitations. The patient has not experienced fatigue, fevers, chills, shortness of breath, chest pain, pain in the right side over the liver, diffuse abdominal pain, nausea, vomiting, constipation, diarrhrea, dry eyes, dry mouth, arthralgias, myalgias, yellowing of the eyes or skin, itching, dark urine, problems concentrating, swelling of the abdomen, swelling of the lower extremities, hematemesis, or hematochezia. ALLERGIES  No Known Allergies    MEDICATIONS  Current Outpatient Prescriptions   Medication Sig    SITagliptin-metFORMIN (JANUMET) 50-1,000 mg per tablet Take 1 Tab by mouth two (2) times daily (with meals).  GLIPIZIDE PO Take  by mouth.  LOSARTAN PO Take  by mouth.  PRAVASTATIN SODIUM (PRAVASTATIN PO) Take  by mouth.  LORATADINE (CLARITIN PO) Take  by mouth.  aspirin delayed-release 81 mg tablet Take  by mouth daily.  FLUTICASONE/SALMETEROL (ADVAIR DISKUS IN) Take  by inhalation.  ALBUTEROL IN Take  by inhalation. No current facility-administered medications for this visit. SYSTEM REVIEW NOT RELATED TO LIVER DISEASE OR REVIEWED ABOVE:  Constitution systems: Negative for fever, chills, weight gain, weight loss. Eyes: Negative for visual changes. ENT: Negative for sore throat, painful swallowing.    Respiratory: Negative for cough, hemoptysis, SOB.   Cardiology: Negative for chest pain, palpitations. GI:  Negative for constipation or diarrhea. : Negative for urinary frequency, dysuria, hematuria, nocturia. Skin: Negative for rash. Hematology: Negative for easy bruising, blood clots. Musculo-skelatal: Negative for back pain, muscle pain, weakness. Neurologic: Negative for headaches, dizziness, vertigo, memory problems not related to HE. Psychology: Negative for anxiety, depression. FAMILY HISTORY:  The patient has no knowledge of the father's medical condition. The mother has the following chronic diseases: breast cancer. There is no family history of liver disease. SOCIAL HISTORY:  The patient is . The patient has no children. The patient has never used tobacco products. The patient has never consumed significant amounts of alcohol. The patient used to work for [employer]. The patient retired in 2015. PHYSICAL EXAMINATION:  /89 (BP 1 Location: Right arm, BP Patient Position: Sitting) Comment: patient didn't take bp meds yet for today  Pulse 74  Temp 97.4 °F (36.3 °C) (Tympanic)   Resp 16  Ht 5' 6\" (1.676 m)  Wt 211 lb (95.7 kg)  SpO2 100%  BMI 34.06 kg/m2  General: No acute distress. Eyes: Sclera anicteric. ENT: No oral lesions. Thyroid normal.  Nodes: No adenopathy. Skin: No spider angiomata. No jaundice. No palmar erythema. Respiratory: Lungs clear to auscultation. Cardiovascular: Regular heart rate. No murmurs. No JVD. Abdomen: Soft non-tender. Liver size normal to percussion/palpation. Spleen not palpable. No obvious ascites. Extremities: No edema. No muscle wasting. No gross arthritic changes. Neurologic: Alert and oriented. Cranial nerves grossly intact. No asterixis.     LABORATORY STUDIES:  Liver [place] of [address] [place] [address] & Units 11/28/2017 8/16/2017   WBC 4.6 - 13.2 K/uL 6.7 4.6   ANC 1.8 - 8.0 K/UL 4.0 2.5   HGB 12.0 - 16.0 g/dL 11.3 (L) 12.4  - 420 K/uL 357 362   AST 15 - 37 U/L 22 41 (H)   ALT 13 - 56 U/L 20 36   Alk Phos 45 - 117 U/L 136 (H) 178 (H)   Bili, Total 0.2 - 1.0 MG/DL 0.3 0.4   Bili, Direct 0.0 - 0.2 MG/DL 0.1 0.1   Albumin 3.4 - 5.0 g/dL 3.3 (L) 3.3 (L)   BUN 7.0 - 18 MG/DL 27 (H) 33 (H)   Creat 0.6 - 1.3 MG/DL 1.93 (H) 1.52 (H)   Na 136 - 145 mmol/L 142 139   K 3.5 - 5.5 mmol/L 6.0 (H) 5.8 (H)   Cl 100 - 108 mmol/L 108 103   CO2 21 - 32 mmol/L 25 26   Glucose 74 - 99 mg/dL 101 (H) 183 (H)     Liver [facility] Latest Ref Rng & Units 4/12/2017   WBC 4.6 - 13.2 K/uL 4.5 (L)   ANC 1.8 - 8.0 K/UL 2.3   HGB 12.0 - 16.0 g/dL 11.4 (L)    - 420 K/uL 334   AST 15 - 37 U/L 42 (H)   ALT 13 - 56 U/L 38   Alk Phos 45 - 117 U/L 114   Bili, Total 0.2 - 1.0 MG/DL 0.3   Bili, Direct 0.0 - 0.2 MG/DL 0.1   Albumin 3.4 - 5.0 g/dL 3.2 (L)   BUN 7.0 - 18 MG/DL 29 (H)   Creat 0.6 - 1.3 MG/DL 1.44 (H)   Na 136 - 145 mmol/L 142   K 3.5 - 5.5 mmol/L 5.6 (H)   Cl 100 - 108 mmol/L 106   CO2 21 - 32 mmol/L 26   Glucose 74 - 99 mg/dL 82     SEROLOGIES:  8/2016. HBsurface antigen negative, anti-HCV positive. Serologies Latest Ref Rng & Units 4/12/2017   Hep A Ab, Total NEGATIVE   NEGATIVE   Hep B Core Ab, Total NEGATIVE   NEGATIVE   Hep B Surface Ab >10.0 mIU/mL <3.10 (L)   Hep B Surface Ab Interp POS   NEGATIVE (A)   Hep C Genotype  1b   HCV RT-PCR, Quant IU/mL [id number]       LIVER HISTOLOGY:  04/2017. Shear wave elastography. E Mean is 8.34 kPa, E Median is 8.46 kPa, E Std is 1.51 kPa. Portal fibrosis with fatty liver. ENDOSCOPIC PROCEDURES:  Not available or performed    RADIOLOGY:  Not available or performed    OTHER TESTING:  Not available or performed    ASSESSMENT AND PLAN:  Chronic HCV with portal fibrosis.   The most recent laboratory studies indicate that the liver transaminases are normal, alkaline phosphatase is elevated, tests of hepatic synthetic and metabolic function are normal, and the platelet count is normal.  Will perform laboratory testing to monitor liver function and degree of liver injury. This will include hepatic panel, a CBC w/ diff, a BMP, drug and alcohol screening, and HCV RNA. Serologic evaluation was negative for all causes of chronic liver disease. Ultrasound was recently performed with shear wave elastography. Liver appears essentially normal without hepatic masses. Shehas portal fibrosis with a component of fatty liver. This will be repeated one year after eradication of HCv to document the regression of hepatic fibrosis. HCV. The patient has genotype 1b. The patient began HCV on 10/24/2017 with once daily Harvoni. She will be treated for 8 weeks total.  This is the only time the HCV has ever been treated. She has no treatment related complaints. The patient's creatinine clearance before beginning HCV treatment was 60.96. Today, her creatinine clearance is 48.01. She only has 4 weeks of treatment remaining. The patient was directed to continue all current medications at the current dosages. There are no contraindications for the patient to take any medications that are necessary for treatment of other medical issues. The patient was instructed not to start any PPI while on treatment. The patient was instructed not to take any antacids within 4 hours of taking the Harvoni. The patient verbalized understanding of these instructions. The patient was counseled regarding alcohol consumption. Vaccination for viral hepatitis A and B is recommended. The patient does not have serologic evidence of prior exposure or vaccination with immunity. All of the above issues were discussed with the patient. All questions were answered. The patient expressed a clear understanding of the above. [address] [facility] 87 in 16 weeks to assess for SVR 12.       Jenn Dias NP   Liver [place] of [address] [place] [address], [address] [place], 3[address] [facility]   [phone]

## 2017-11-30 LAB — HCV RNA SERPL QL NAA+PROBE: NEGATIVE

## 2020-08-24 ENCOUNTER — VIRTUAL VISIT (OUTPATIENT)
Dept: HEMATOLOGY | Age: 60
End: 2020-08-24

## 2020-08-24 DIAGNOSIS — B18.2 CHRONIC HEPATITIS C WITHOUT HEPATIC COMA (HCC): Primary | ICD-10-CM

## 2020-08-24 NOTE — PROGRESS NOTES
[address] [place] [address] MD Sun Eve Rosette, MD Clayborne Hobby, PAMarianelaC    Mery Perkins, [facility]-BC     Cheryl Robles [facility]   MADELINE Jackson-CLYDE [facility]       Bette Ribeiro [address] [place] [address]    at [address], [facility] Gissell Hopkins  22.    [phone]    FAX: [address] [place] [address], [address]    [phone]    [address] [place] [address] Street: [phone]         Patient Care Team:  Brooks Vuong as PCP - General (Physician Assistant)      Problem List  Date Reviewed: 11/28/2017          Codes Class Noted    Chronic hepatitis C ([facility] ICD-10-CM: B18.2  ICD-9-CM: 070.54  4/12/2017        Type II diabetes mellitus ([facility] ICD-10-CM: E11.9  ICD-9-CM: 250.00  4/12/2017        Hypertension ICD-10-CM: I10  ICD-9-CM: 401.9  4/12/2017        Hypercholesterolemia ICD-10-CM: E78.00  ICD-9-CM: 272.0  4/12/2017        Asthma ICD-10-CM: J45.909  ICD-9-CM: 493.90  4/12/2017        History of cornea transplant ICD-10-CM: Z94.7  ICD-9-CM: V42.5  4/12/2017        S/P cholecystectomy ICD-10-CM: Z90.49  ICD-9-CM: V45.79  4/12/2017              Vane Pastrana is being seen at [address] for management of chronic HCV. The active problem list, all pertinent past medical history, medications, radiologic findings and laboratory findings related to the liver disorder were reviewed with the patient. The patient is a 63 y.o. Black female who was found to have chronic HCV in 8/2016. Assessment of liver fibrosis with Fibroscan was performed In 7/2017. The result was 8.4 kPa which correlates with stage 2 septal fibrosis    She was treated for chronic HCV with Wyona Ruts starting in 10/2017.   She says she stopped her treatment a few weeks early because the medication was giving her nausea. The patient has no symptoms which can be attributed to the liver disorder. The patient is not currently experiencing the following symptoms of liver disease:  fatigue, pain in the right side over the liver,     The patient completes all daily activities without any functional limitations. ASSESSMENT AND PLAN:  Chronic HCV   Chronic HCV with stage 2 septal fibrosis. Severity of the liver disease was assessed by laboratory studies, elastography in 4/2017. Se does not have any liver labs that I can see jignesh she was treated for HCV. Prior to starting HCV treatment the AST is elevated. ALT is normal.  ALP is elevated. Liver function is normal.  The platelet count is normal.      Will perform laboratory testing to monitor liver function and degree of liver injury. This included   BMP, hepatic panel, CBC with platelet count,     Will perform HCV viral load, to define if she has achieved SVR/cure. The need to perform an assessment of liver fibrosis was discussed with the patient. The Fibroscan can assess liver fibrosis and determine if a patient has advanced fibrosis or cirrhosis without the need for liver biopsy. This will be performed at the next office visit. If she is HCV RNA undetectable and Fibroscan suggests mild fibrosis, stage 2 or less, then no further follow-up is needed. Chronic HCV Treatment  The patient was previously treated for HCV with Harvoni (sofasbuvir and ledipasvir) starting in 10/2017. She took 10 or 11 weeks of the planned 12 week course. The previous treatment response was not clearly defined. She never came back for follow-up tetig to see if she was cured. I see no liver enzymes or HCV RNA in either the [facility] or [address] [place] [address]. The patient has HCV genotype 1A. Will repeat HCV RNA to see if she is SVR/cured. She probably was.       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 The last serum creatinine from 3/2020 was 1.9 mg  NSAIDs should not be utilized as this may worsen CKD. Treatment of other medical problems in patients with chronic liver disease  There are no contraindications for the patient to take most medications that are necessary for treatment of other medical issues. The patient does not comsume alcohol on a daily basis. Normal doses of acetaminophen, as recommended on the label of the bottle, are not hepatotoxic except in the setting of daily alcohol use, even in patients with cirrhosis and can be utilized for pain. Counseling for alcohol in patients with chronic liver disease  The patient was counseled regarding alcohol consumption and the effect of alcohol on chronic liver disease. The patient does not consume any significant amount of alcohol. Vaccinations   Vaccination for viral hepatitis A and B is recommended since the patient has no serologic evidence of previous exposure or vaccination with immunity. Routine vaccinations against other bacterial and viral agents can be performed as indicated. Annual flu vaccination should be administered if indicated. ALLERGIES  No Known Allergies    MEDICATIONS  Current Outpatient Medications   Medication Sig    SITagliptin-metFORMIN (JANUMET) 50-1,000 mg per tablet Take 1 Tab by mouth two (2) times daily (with meals).  GLIPIZIDE PO Take  by mouth.  LOSARTAN PO Take  by mouth.  PRAVASTATIN SODIUM (PRAVASTATIN PO) Take  by mouth.  LORATADINE (CLARITIN PO) Take  by mouth.  aspirin delayed-release 81 mg tablet Take  by mouth daily.  ALBUTEROL IN Take  by inhalation.  FLUTICASONE/SALMETEROL (ADVAIR DISKUS IN) Take  by inhalation. No current facility-administered medications for this visit.         SYSTEM REVIEW NOT RELATED TO LIVER DISEASE OR REVIEWED ABOVE:  Constitution systems: Negative for fever, chills, weight gain, weight loss.   Eyes: Negative for visual changes. ENT: Negative for sore throat, painful swallowing. Respiratory: Negative for cough, hemoptysis, SOB. Cardiology: Negative for chest pain, palpitations. GI:  Negative for constipation or diarrhea. : Negative for urinary frequency, dysuria, hematuria, nocturia. Skin: Negative for rash. Hematology: Negative for easy bruising, blood clots. Musculo-skelatal: Negative for back pain, muscle pain, weakness. Neurologic: Negative for headaches, dizziness, vertigo, memory problems not related to HE. Psychology: Negative for anxiety, depression. FAMILY HISTORY:  The patient has no knowledge of the father's medical condition. The mother has the following chronic diseases: breast cancer. There is no family history of liver disease. SOCIAL HISTORY:  The patient is . The patient has no children. The patient has never used tobacco products. The patient has never consumed significant amounts of alcohol. The patient used to work for [employer]. The patient retired in 2015. PHYSICAL EXAMINATION PERFORMED BY VIRTUAL TELEHEALTH:  VS: Not performed   General: No acute distress. Eyes: Sclera anicteric. ENT: No oral lesions. Skin: No rashes. spider angiomata. No jaundice. Abdomen: No obvious distention suggesting ascites. Extremities: No edema. No muscle wasting. Neurologic: Alert and oriented. Cranial nerves grossly intact.       LABORATORY STUDIES:  Liver [place] of [address] Units 11/28/2017 8/16/2017   WBC 4.6 - 13.2 K/uL 6.7 4.6   ANC 1.8 - 8.0 K/UL 4.0 2.5   HGB 12.0 - 16.0 g/dL 11.3 (L) 12.4    - 420 K/uL 357 362   AST 15 - 37 U/L 22 41 (H)   ALT 13 - 56 U/L 20 36   Alk Phos 45 - 117 U/L 136 (H) 178 (H)   Bili, Total 0.2 - 1.0 MG/DL 0.3 0.4   Bili, Direct 0.0 - 0.2 MG/DL 0.1 0.1   Albumin 3.4 - 5.0 g/dL 3.3 (L) 3.3 (L)   BUN 7.0 - 18 MG/DL 27 (H) 33 (H)   Creat 0.6 - 1.3 MG/DL 1.93 (H) 1.52 (H)   Na 136 - 145 mmol/L 142 139   K 3.5 - 5.5 mmol/L 6.0 (H) 5.8 (H)   Cl 100 - 108 mmol/L 108 103   CO2 21 - 32 mmol/L 25 26   Glucose 74 - 99 mg/dL 101 (H) 183 (H)     SEROLOGIES:  8/2016. HBsurface antigen negative, anti-HCV positive. Serologies Latest Ref Rng & Units 4/12/2017   Hep A Ab, Total NEGATIVE   NEGATIVE   Hep B Core Ab, Total NEGATIVE   NEGATIVE   Hep B Surface Ab >10.0 mIU/mL <3.10 (L)   Hep B Surface Ab Interp POS   NEGATIVE (A)   Hep C Genotype  1b   HCV RT-PCR, Quant IU/mL [id number]       LIVER HISTOLOGY:  04/2017. Shear wave elastography. E Mean is 8.34 kPa, E Median is 8.46 kPa, E Std is 1.51 kPa. Portal fibrosis with fatty liver. ENDOSCOPIC PROCEDURES:  Not available or performed    RADIOLOGY:  4/2017. Ultrasound of liver. Normal appearing liver. No liver mass lesions. OTHER TESTING:  Not available or performed    FOLLOW-UP AFTER VIRTUAL VISIT:  Pursuant to the emergency declaration under the [facility], [facility] waiver authority and the [employer] Act, this Virtual  Visit was conducted, with the patient's (and/or their legal guardian's) consent, to reduce the patient's risk of exposure to COVID-19 and provide necessary medical care. Services were provided through a video synchronous discussion virtually to substitute for an in-person clinic visit. The patient was located in their home. The provider was located in the [facility] office. All of the issues listed above in the Assessment and Plan were discussed with the patient. All questions were answered. The patient expressed a clear understanding of the above. Orders to obtain laboratory testing will be mailed to the patient and obtained 1 week prior to the next TeleHealth or in-person encounter.     An in-person follow-up visit will be scheduled at [place] [address] in 4 weeks for Fibroscan to review all data and determine the treatment plan.       Zack Cobian MD  [id number] [facility] [address]  [address], [address] [place] [address], [facility] [address] [place] [address]  12 [facility]